# Patient Record
(demographics unavailable — no encounter records)

---

## 2024-10-24 NOTE — HEALTH RISK ASSESSMENT
[Yes] : Yes [2 - 4 times a month (2 pts)] : 2-4 times a month (2 points) [1 or 2 (0 pts)] : 1 or 2 (0 points) [Never (0 pts)] : Never (0 points) [No falls in past year] : Patient reported no falls in the past year [0] : 2) Feeling down, depressed, or hopeless: Not at all (0) [PHQ-2 Negative - No further assessment needed] : PHQ-2 Negative - No further assessment needed [Patient/Caregiver not ready to engage] : , patient/caregiver not ready to engage [Never] : Never [Audit-CScore] : 2 [[id number]] : 0

## 2024-10-24 NOTE — HISTORY OF PRESENT ILLNESS
[FreeTextEntry1] : 52 yo female with PMHx of iron deficiency anemia, SALTY, hemochromatosis carrier presents for medication refill. Pt denies any other concerns and complaints. Pt denies any recent hospitalizations, illnesses, and trauma. Pt denies fever, chills. [de-identified] : 50 yo female with PMHx of iron deficiency anemia, SALTY, hemochromatosis carrier presents for medication refill. Pt denies any other concerns and complaints. Pt denies any recent hospitalizations, illnesses, and trauma. Pt denies fever, chills. pt states she had razor burn on the bikini line x 1 week. Pt states she scratched the area.

## 2024-10-24 NOTE — PLAN
[FreeTextEntry1] : 50 yo female with PMHx of iron deficiency anemia, SALTY, hemochromatosis carrier presents for medication refill. Pt denies any other concerns and complaints. Pt denies any recent hospitalizations, illnesses, and trauma. Pt denies fever, chills.  care plan reviewed labs reviewed medications reviewed and renewed  - augmentin 875 bid x 10 days  - clotrimazole 1% cr apply to aa bid x 14 days  RTC in 3 months

## 2024-10-24 NOTE — HISTORY OF PRESENT ILLNESS
[FreeTextEntry1] : 52 yo female with PMHx of iron deficiency anemia, SALTY, hemochromatosis carrier presents for medication refill. Pt denies any other concerns and complaints. Pt denies any recent hospitalizations, illnesses, and trauma. Pt denies fever, chills. [de-identified] : 52 yo female with PMHx of iron deficiency anemia, SALTY, hemochromatosis carrier presents for medication refill. Pt denies any other concerns and complaints. Pt denies any recent hospitalizations, illnesses, and trauma. Pt denies fever, chills. pt states she had razor burn on the bikini line x 1 week. Pt states she scratched the area.

## 2024-10-24 NOTE — PLAN
[FreeTextEntry1] : 52 yo female with PMHx of iron deficiency anemia, SALTY, hemochromatosis carrier presents for medication refill. Pt denies any other concerns and complaints. Pt denies any recent hospitalizations, illnesses, and trauma. Pt denies fever, chills.  care plan reviewed labs reviewed medications reviewed and renewed  - augmentin 875 bid x 10 days  - clotrimazole 1% cr apply to aa bid x 14 days  RTC in 3 months

## 2024-10-24 NOTE — PHYSICAL EXAM
[Well Nourished] : well nourished [Well Developed] : well developed [Well-Appearing] : well-appearing [Normal Sclera/Conjunctiva] : normal sclera/conjunctiva [PERRL] : pupils equal round and reactive to light [EOMI] : extraocular movements intact [Normal Outer Ear/Nose] : the outer ears and nose were normal in appearance [Normal Oropharynx] : the oropharynx was normal [No Lymphadenopathy] : no lymphadenopathy [Supple] : supple [Thyroid Normal, No Nodules] : the thyroid was normal and there were no nodules present [No Respiratory Distress] : no respiratory distress  [No Accessory Muscle Use] : no accessory muscle use [Clear to Auscultation] : lungs were clear to auscultation bilaterally [Normal Rate] : normal rate  [Regular Rhythm] : with a regular rhythm [Normal S1, S2] : normal S1 and S2 [No Murmur] : no murmur heard [Pedal Pulses Present] : the pedal pulses are present [No Edema] : there was no peripheral edema [No Extremity Clubbing/Cyanosis] : no extremity clubbing/cyanosis [Soft] : abdomen soft [Non Tender] : non-tender [Non-distended] : non-distended [No Masses] : no abdominal mass palpated [No HSM] : no HSM [Normal Bowel Sounds] : normal bowel sounds [No CVA Tenderness] : no CVA  tenderness [No Spinal Tenderness] : no spinal tenderness [No Joint Swelling] : no joint swelling [Grossly Normal Strength/Tone] : grossly normal strength/tone [Coordination Grossly Intact] : coordination grossly intact [No Focal Deficits] : no focal deficits [Normal Gait] : normal gait [Normal Affect] : the affect was normal [Normal Insight/Judgement] : insight and judgment were intact [de-identified] : Erythematous rash at dorsal aspect of b/l hands

## 2024-10-24 NOTE — HEALTH RISK ASSESSMENT
[Yes] : Yes [2 - 4 times a month (2 pts)] : 2-4 times a month (2 points) [1 or 2 (0 pts)] : 1 or 2 (0 points) [Never (0 pts)] : Never (0 points) [No falls in past year] : Patient reported no falls in the past year [0] : 2) Feeling down, depressed, or hopeless: Not at all (0) [PHQ-2 Negative - No further assessment needed] : PHQ-2 Negative - No further assessment needed [Patient/Caregiver not ready to engage] : , patient/caregiver not ready to engage [Never] : Never [Audit-CScore] : 2 [[id number]] : 0

## 2024-10-24 NOTE — PHYSICAL EXAM
[Well Nourished] : well nourished [Well Developed] : well developed [Well-Appearing] : well-appearing [Normal Sclera/Conjunctiva] : normal sclera/conjunctiva [PERRL] : pupils equal round and reactive to light [EOMI] : extraocular movements intact [Normal Outer Ear/Nose] : the outer ears and nose were normal in appearance [Normal Oropharynx] : the oropharynx was normal [No Lymphadenopathy] : no lymphadenopathy [Supple] : supple [Thyroid Normal, No Nodules] : the thyroid was normal and there were no nodules present [No Respiratory Distress] : no respiratory distress  [No Accessory Muscle Use] : no accessory muscle use [Clear to Auscultation] : lungs were clear to auscultation bilaterally [Normal Rate] : normal rate  [Regular Rhythm] : with a regular rhythm [Normal S1, S2] : normal S1 and S2 [No Murmur] : no murmur heard [Pedal Pulses Present] : the pedal pulses are present [No Edema] : there was no peripheral edema [No Extremity Clubbing/Cyanosis] : no extremity clubbing/cyanosis [Soft] : abdomen soft [Non Tender] : non-tender [Non-distended] : non-distended [No Masses] : no abdominal mass palpated [No HSM] : no HSM [Normal Bowel Sounds] : normal bowel sounds [No CVA Tenderness] : no CVA  tenderness [No Spinal Tenderness] : no spinal tenderness [No Joint Swelling] : no joint swelling [Grossly Normal Strength/Tone] : grossly normal strength/tone [Coordination Grossly Intact] : coordination grossly intact [No Focal Deficits] : no focal deficits [Normal Gait] : normal gait [Normal Affect] : the affect was normal [Normal Insight/Judgement] : insight and judgment were intact [de-identified] : Erythematous rash at dorsal aspect of b/l hands

## 2024-12-17 NOTE — HEALTH RISK ASSESSMENT
[Yes] : Yes [Monthly or less (1 pt)] : Monthly or less (1 point) [1 or 2 (0 pts)] : 1 or 2 (0 points) [Never (0 pts)] : Never (0 points) [No] : In the past 12 months have you used drugs other than those required for medical reasons? No [No falls in past year] : Patient reported no falls in the past year [0] : 2) Feeling down, depressed, or hopeless: Not at all (0) [PHQ-2 Negative - No further assessment needed] : PHQ-2 Negative - No further assessment needed [Patient/Caregiver not ready to engage] : , patient/caregiver not ready to engage [Never] : Never [Audit-CScore] : 1 [[facility]] : 8 [[id number]] : 0

## 2024-12-17 NOTE — COUNSELING
[Fall prevention counseling provided] : Fall prevention counseling provided [Adequate lighting] : Adequate lighting [No throw rugs] : No throw rugs [Use proper foot wear] : Use proper foot wear [Behavioral health counseling provided] : Behavioral health counseling provided [Sleep ___ hours/day] : Sleep [unfilled] hours/day [Engage in a relaxing activity] : Engage in a relaxing activity [Plan in advance] : Plan in advance [AUDIT-C Screening administered and reviewed] : AUDIT-C Screening administered and reviewed [Potential consequences of obesity discussed] : Potential consequences of obesity discussed [Benefits of weight loss discussed] : Benefits of weight loss discussed [Encouraged to increase physical activity] : Encouraged to increase physical activity [Decrease Portions] : decrease portions [None] : None [Good understanding] : Patient has a good understanding of lifestyle changes and steps needed to achieve self management goal [FreeTextEntry2] : never smoker

## 2024-12-17 NOTE — HEALTH RISK ASSESSMENT
[Yes] : Yes [Monthly or less (1 pt)] : Monthly or less (1 point) [1 or 2 (0 pts)] : 1 or 2 (0 points) [Never (0 pts)] : Never (0 points) [No] : In the past 12 months have you used drugs other than those required for medical reasons? No [No falls in past year] : Patient reported no falls in the past year [0] : 2) Feeling down, depressed, or hopeless: Not at all (0) [PHQ-2 Negative - No further assessment needed] : PHQ-2 Negative - No further assessment needed [Patient/Caregiver not ready to engage] : , patient/caregiver not ready to engage [Never] : Never [Audit-CScore] : 1 [facility] [[id number]] : 0

## 2025-03-25 NOTE — HEALTH RISK ASSESSMENT
[Yes] : Yes [Monthly or less (1 pt)] : Monthly or less (1 point) [1 or 2 (0 pts)] : 1 or 2 (0 points) [Never (0 pts)] : Never (0 points) [No] : In the past 12 months have you used drugs other than those required for medical reasons? No [No falls in past year] : Patient reported no falls in the past year [0] : 2) Feeling down, depressed, or hopeless: Not at all (0) [PHQ-2 Negative - No further assessment needed] : PHQ-2 Negative - No further assessment needed [Patient/Caregiver not ready to engage] : , patient/caregiver not ready to engage [Former] : Former [> 15 Years] : > 15 Years [Audit-CScore] : 1 [facility] [[id number]] : 0

## 2025-03-25 NOTE — PLAN
[FreeTextEntry1] : 50 y/o F with PMHx of anxiety presents for follow-up and medication renewal. She reports being in good health and has no acute complaints at today's visit. The patient denies chest pain, SOB, fever, recent illnesses/hospitalizations, abdominal pain, or N/V/D.  - medications reviewed and refilled - labs ordered - RTC 3 months

## 2025-03-25 NOTE — HISTORY OF PRESENT ILLNESS
[FreeTextEntry1] : 52 y/o F with PMHx of anxiety presents for follow-up and medication renewal  [de-identified] : 52 y/o F with PMHx of anxiety presents for follow-up and medication renewal. She reports being in good health and has no acute complaints at today's visit. The patient denies chest pain, SOB, fever, recent illnesses/hospitalizations, abdominal pain, or N/V/D.

## 2025-03-25 NOTE — PLAN
[FreeTextEntry1] : 52 y/o F with PMHx of anxiety presents for follow-up and medication renewal. She reports being in good health and has no acute complaints at today's visit. The patient denies chest pain, SOB, fever, recent illnesses/hospitalizations, abdominal pain, or N/V/D.  - medications reviewed and refilled - labs ordered - RTC 3 months

## 2025-03-25 NOTE — HISTORY OF PRESENT ILLNESS
[FreeTextEntry1] : 50 y/o F with PMHx of anxiety presents for follow-up and medication renewal  [de-identified] : 50 y/o F with PMHx of anxiety presents for follow-up and medication renewal. She reports being in good health and has no acute complaints at today's visit. The patient denies chest pain, SOB, fever, recent illnesses/hospitalizations, abdominal pain, or N/V/D.

## 2025-03-25 NOTE — HEALTH RISK ASSESSMENT
[Yes] : Yes [Monthly or less (1 pt)] : Monthly or less (1 point) [1 or 2 (0 pts)] : 1 or 2 (0 points) [Never (0 pts)] : Never (0 points) [No] : In the past 12 months have you used drugs other than those required for medical reasons? No [No falls in past year] : Patient reported no falls in the past year [0] : 2) Feeling down, depressed, or hopeless: Not at all (0) [PHQ-2 Negative - No further assessment needed] : PHQ-2 Negative - No further assessment needed [Patient/Caregiver not ready to engage] : , patient/caregiver not ready to engage [Former] : Former [> 15 Years] : > 15 Years [Audit-CScore] : 1 [facility] [[id number]] : 0

## 2025-05-13 NOTE — HEALTH RISK ASSESSMENT
[Yes] : Yes [Monthly or less (1 pt)] : Monthly or less (1 point) [1 or 2 (0 pts)] : 1 or 2 (0 points) [Never (0 pts)] : Never (0 points) [No] : In the past 12 months have you used drugs other than those required for medical reasons? No [No falls in past year] : Patient reported no falls in the past year [0] : 2) Feeling down, depressed, or hopeless: Not at all (0) [PHQ-2 Negative - No further assessment needed] : PHQ-2 Negative - No further assessment needed [Patient/Caregiver not ready to engage] : , patient/caregiver not ready to engage [Former] : Former [> 15 Years] : > 15 Years [Audit-CScore] : 1 [[facility]] : 8 [[id number]] : 0

## 2025-05-13 NOTE — HEALTH RISK ASSESSMENT
[Yes] : Yes [Monthly or less (1 pt)] : Monthly or less (1 point) [1 or 2 (0 pts)] : 1 or 2 (0 points) [Never (0 pts)] : Never (0 points) [No] : In the past 12 months have you used drugs other than those required for medical reasons? No [No falls in past year] : Patient reported no falls in the past year [0] : 2) Feeling down, depressed, or hopeless: Not at all (0) [PHQ-2 Negative - No further assessment needed] : PHQ-2 Negative - No further assessment needed [Patient/Caregiver not ready to engage] : , patient/caregiver not ready to engage [Former] : Former [> 15 Years] : > 15 Years [Audit-CScore] : 1 [facility] [[id number]] : 0

## 2025-05-13 NOTE — HISTORY OF PRESENT ILLNESS
[FreeTextEntry1] : 52 y/o F with PMHx of anxiety presents for follow-up and medication renewal  [de-identified] : 50 y/o F with PMHx of anxiety presents for follow-up and medication renewal. She reports being in good health and has no acute complaints at today's visit.

## 2025-05-13 NOTE — HISTORY OF PRESENT ILLNESS
[FreeTextEntry1] : 50 y/o F with PMHx of anxiety presents for follow-up and medication renewal  [de-identified] : 52 y/o F with PMHx of anxiety presents for follow-up and medication renewal. She reports being in good health and has no acute complaints at today's visit.

## 2025-06-27 NOTE — PHYSICAL EXAM
[No Acute Distress] : no acute distress [Low Lying Soft Palate] : low lying soft palate [Elongated Uvula] : elongated uvula [Enlarged Base of the Tongue] : enlarged base of the tongue [II] : Mallampati Class: II [Normal Rate/Rhythm] : normal rate/rhythm [Normal S1, S2] : normal s1, s2 [No Resp Distress] : no resp distress [No Acc Muscle Use] : no acc muscle use [Clear to Auscultation Bilaterally] : clear to auscultation bilaterally [Normal Color/ Pigmentation] : normal color/ pigmentation [Oriented x3] : oriented x3 [Normal Mood] : normal mood [Normal Affect] : normal affect

## 2025-06-27 NOTE — HISTORY OF PRESENT ILLNESS
[Former] : former [< 20 pack-years] : < 20 pack-years [TextBox_4] : Hx abn CT.  Was last here more than 10 years ago; note not in this record. Had CTs of chest at [facility] which were stable for > 2yrs. Last one >10 years ago.  About 6 m/a noted she is waking up with a cough, resolves in the day. Some wheeze noted. No wheeze noted by any doctors. No sob. No CP.   Had cardiac eval with Dr Shrestha within the past 2 years; had CT chest done at [facility].   [place] done 6/27/25: normal; no BD response  Snores. No EDS.   Former light smoker; <20 pys. Last one 2012.   Not much cardio exercise. Gained about 20 lbs since covid.  On prn diazepam for vaginal spasms.   .  [ESS] : 1

## 2025-06-27 NOTE — ASSESSMENT
[FreeTextEntry1] : No evidence of lung dz. No wheeze on exam; what she is experiencing does not sound like wheeze. Hx of nodules that were stable in the past >2yrs. <20 py smoking hx. Possible MESFIN.

## 2025-06-27 NOTE — PLAN
[TextEntry] : CXR. I have asked from records from [facility]. She said she can get me the discs as well. Recc sleep study; she defers. Wants to try to lose weight first. Will reconsider on next visit. Reviewed with the patient the short and long term health consequences of untreated MESFIN. Risk include, but not limited to, HTN, heart disease, stroke, MVAs. Weight loss. Exercise. Further reccs will come.

## 2025-06-30 NOTE — PHYSICAL EXAM
[No Acute Distress] : no acute distress [Well Nourished] : well nourished [Well Developed] : well developed [Well-Appearing] : well-appearing [Normal Sclera/Conjunctiva] : normal sclera/conjunctiva [PERRL] : pupils equal round and reactive to light [EOMI] : extraocular movements intact [Normal Outer Ear/Nose] : the outer ears and nose were normal in appearance [Normal Oropharynx] : the oropharynx was normal [No JVD] : no jugular venous distention [No Lymphadenopathy] : no lymphadenopathy [Supple] : supple [Thyroid Normal, No Nodules] : the thyroid was normal and there were no nodules present [No Respiratory Distress] : no respiratory distress  [No Accessory Muscle Use] : no accessory muscle use [Clear to Auscultation] : lungs were clear to auscultation bilaterally [Normal Rate] : normal rate  [Regular Rhythm] : with a regular rhythm [Normal S1, S2] : normal S1 and S2 [No Murmur] : no murmur heard [No Carotid Bruits] : no carotid bruits [No Abdominal Bruit] : a ~M bruit was not heard ~T in the abdomen [No Varicosities] : no varicosities [Pedal Pulses Present] : the pedal pulses are present [No Edema] : there was no peripheral edema [No Palpable Aorta] : no palpable aorta Detail Level: Detailed [No Extremity Clubbing/Cyanosis] : no extremity clubbing/cyanosis [Soft] : abdomen soft [Non Tender] : non-tender [Non-distended] : non-distended [No Masses] : no abdominal mass palpated [No HSM] : no HSM [Normal Bowel Sounds] : normal bowel sounds [Normal Posterior Cervical Nodes] : no posterior cervical lymphadenopathy [Normal Anterior Cervical Nodes] : no anterior cervical lymphadenopathy [No CVA Tenderness] : no CVA  tenderness [No Spinal Tenderness] : no spinal tenderness [No Joint Swelling] : no joint swelling [Grossly Normal Strength/Tone] : grossly normal strength/tone [No Rash] : no rash [Coordination Grossly Intact] : coordination grossly intact [No Focal Deficits] : no focal deficits [Normal Gait] : normal gait [Deep Tendon Reflexes (DTR)] : deep tendon reflexes were 2+ and symmetric [Normal Affect] : the affect was normal [Normal Insight/Judgement] : insight and judgment were intact

## 2025-07-01 NOTE — HEALTH RISK ASSESSMENT
[Yes] : Yes [Monthly or less (1 pt)] : Monthly or less (1 point) [1 or 2 (0 pts)] : 1 or 2 (0 points) [Never (0 pts)] : Never (0 points) [No] : In the past 12 months have you used drugs other than those required for medical reasons? No [No falls in past year] : Patient reported no falls in the past year [0] : 2) Feeling down, depressed, or hopeless: Not at all (0) [PHQ-2 Negative - No further assessment needed] : PHQ-2 Negative - No further assessment needed [Patient/Caregiver not ready to engage] : , patient/caregiver not ready to engage [Former] : Former [> 15 Years] : > 15 Years [Audit-CScore] : 1 [facility] [[id number]] : 0

## 2025-07-01 NOTE — HISTORY OF PRESENT ILLNESS
[FreeTextEntry1] : 52 y/o F with PMHx of anxiety presents for follow-up and medication renewal  [de-identified] : 52 y/o F with PMHx of anxiety presents for follow-up and medication renewal. She reports being in good health and has no acute complaints at today's visit.